# Patient Record
Sex: MALE | Race: BLACK OR AFRICAN AMERICAN
[De-identification: names, ages, dates, MRNs, and addresses within clinical notes are randomized per-mention and may not be internally consistent; named-entity substitution may affect disease eponyms.]

---

## 2017-03-06 ENCOUNTER — HOSPITAL ENCOUNTER (OUTPATIENT)
Dept: HOSPITAL 62 - ER | Age: 57
Setting detail: OBSERVATION
LOS: 2 days | Discharge: HOME | End: 2017-03-08
Attending: FAMILY MEDICINE | Admitting: FAMILY MEDICINE
Payer: MEDICAID

## 2017-03-06 DIAGNOSIS — Z23: ICD-10-CM

## 2017-03-06 DIAGNOSIS — K40.30: Primary | ICD-10-CM

## 2017-03-06 DIAGNOSIS — Z79.4: ICD-10-CM

## 2017-03-06 DIAGNOSIS — Z95.1: ICD-10-CM

## 2017-03-06 DIAGNOSIS — E11.9: ICD-10-CM

## 2017-03-06 DIAGNOSIS — I13.0: ICD-10-CM

## 2017-03-06 DIAGNOSIS — Z79.02: ICD-10-CM

## 2017-03-06 DIAGNOSIS — I25.2: ICD-10-CM

## 2017-03-06 DIAGNOSIS — I50.9: ICD-10-CM

## 2017-03-06 DIAGNOSIS — N18.9: ICD-10-CM

## 2017-03-06 DIAGNOSIS — E66.01: ICD-10-CM

## 2017-03-06 DIAGNOSIS — J44.9: ICD-10-CM

## 2017-03-06 DIAGNOSIS — I25.10: ICD-10-CM

## 2017-03-06 LAB
ALBUMIN SERPL-MCNC: 4.6 G/DL (ref 3.5–5)
ALP SERPL-CCNC: 71 U/L (ref 38–126)
ALT SERPL-CCNC: 30 U/L (ref 21–72)
ANION GAP SERPL CALC-SCNC: 13 MMOL/L (ref 5–19)
APPEARANCE UR: CLEAR
AST SERPL-CCNC: 19 U/L (ref 17–59)
BASOPHILS # BLD AUTO: 0.1 10^3/UL (ref 0–0.2)
BASOPHILS NFR BLD AUTO: 1.4 % (ref 0–2)
BILIRUB DIRECT SERPL-MCNC: 0 MG/DL (ref 0–0.3)
BILIRUB SERPL-MCNC: 0.8 MG/DL (ref 0.2–1.3)
BILIRUB UR QL STRIP: NEGATIVE
BUN SERPL-MCNC: 24 MG/DL (ref 7–20)
CALCIUM: 10.1 MG/DL (ref 8.4–10.2)
CHLORIDE SERPL-SCNC: 104 MMOL/L (ref 98–107)
CO2 SERPL-SCNC: 27 MMOL/L (ref 22–30)
CREAT SERPL-MCNC: 1.43 MG/DL (ref 0.52–1.25)
EOSINOPHIL # BLD AUTO: 0.3 10^3/UL (ref 0–0.6)
EOSINOPHIL NFR BLD AUTO: 3.5 % (ref 0–6)
ERYTHROCYTE [DISTWIDTH] IN BLOOD BY AUTOMATED COUNT: 14 % (ref 11.5–14)
GLUCOSE SERPL-MCNC: 255 MG/DL (ref 75–110)
GLUCOSE UR STRIP-MCNC: NEGATIVE MG/DL
HCT VFR BLD CALC: 39.6 % (ref 37.9–51)
HGB BLD-MCNC: 13.1 G/DL (ref 13.5–17)
HGB HCT DIFFERENCE: -0.3
KETONES UR STRIP-MCNC: NEGATIVE MG/DL
LYMPHOCYTES # BLD AUTO: 2.3 10^3/UL (ref 0.5–4.7)
LYMPHOCYTES NFR BLD AUTO: 28.6 % (ref 13–45)
MCH RBC QN AUTO: 26.7 PG (ref 27–33.4)
MCHC RBC AUTO-ENTMCNC: 33 G/DL (ref 32–36)
MCV RBC AUTO: 81 FL (ref 80–97)
MONOCYTES # BLD AUTO: 0.7 10^3/UL (ref 0.1–1.4)
MONOCYTES NFR BLD AUTO: 9.1 % (ref 3–13)
NEUTROPHILS # BLD AUTO: 4.6 10^3/UL (ref 1.7–8.2)
NEUTS SEG NFR BLD AUTO: 57.4 % (ref 42–78)
NITRITE UR QL STRIP: NEGATIVE
PH UR STRIP: 5 [PH] (ref 5–9)
POTASSIUM SERPL-SCNC: 4.9 MMOL/L (ref 3.6–5)
PROT SERPL-MCNC: 8 G/DL (ref 6.3–8.2)
PROT UR STRIP-MCNC: NEGATIVE MG/DL
RBC # BLD AUTO: 4.89 10^6/UL (ref 4.35–5.55)
SODIUM SERPL-SCNC: 144.2 MMOL/L (ref 137–145)
SP GR UR STRIP: 1.02
UROBILINOGEN UR-MCNC: NEGATIVE MG/DL (ref ?–2)
WBC # BLD AUTO: 7.9 10^3/UL (ref 4–10.5)

## 2017-03-06 PROCEDURE — 93010 ELECTROCARDIOGRAM REPORT: CPT

## 2017-03-06 PROCEDURE — 74176 CT ABD & PELVIS W/O CONTRAST: CPT

## 2017-03-06 PROCEDURE — 82962 GLUCOSE BLOOD TEST: CPT

## 2017-03-06 PROCEDURE — 90471 IMMUNIZATION ADMIN: CPT

## 2017-03-06 PROCEDURE — 36415 COLL VENOUS BLD VENIPUNCTURE: CPT

## 2017-03-06 PROCEDURE — 99285 EMERGENCY DEPT VISIT HI MDM: CPT

## 2017-03-06 PROCEDURE — 93005 ELECTROCARDIOGRAM TRACING: CPT

## 2017-03-06 PROCEDURE — 80053 COMPREHEN METABOLIC PANEL: CPT

## 2017-03-06 PROCEDURE — 76870 US EXAM SCROTUM: CPT

## 2017-03-06 PROCEDURE — 90686 IIV4 VACC NO PRSV 0.5 ML IM: CPT

## 2017-03-06 PROCEDURE — 85025 COMPLETE CBC W/AUTO DIFF WBC: CPT

## 2017-03-06 PROCEDURE — 3E0234Z INTRODUCTION OF SERUM, TOXOID AND VACCINE INTO MUSCLE, PERCUTANEOUS APPROACH: ICD-10-PCS | Performed by: EMERGENCY MEDICINE

## 2017-03-06 PROCEDURE — 93976 VASCULAR STUDY: CPT

## 2017-03-06 PROCEDURE — 81001 URINALYSIS AUTO W/SCOPE: CPT

## 2017-03-06 PROCEDURE — G0378 HOSPITAL OBSERVATION PER HR: HCPCS

## 2017-03-06 PROCEDURE — S0028 INJECTION, FAMOTIDINE, 20 MG: HCPCS

## 2017-03-06 RX ADMIN — SODIUM CHLORIDE PRN ML: 0.45 INJECTION, SOLUTION INTRAVENOUS at 23:40

## 2017-03-06 RX ADMIN — HYDROMORPHONE HYDROCHLORIDE PRN MG: 2 INJECTION INTRAMUSCULAR; INTRAVENOUS; SUBCUTANEOUS at 23:55

## 2017-03-06 RX ADMIN — FAMOTIDINE SCH MG: 10 INJECTION INTRAVENOUS at 23:55

## 2017-03-06 NOTE — ER DOCUMENT REPORT
ED GI/





- General


Chief Complaint: Groin Pain


Stated Complaint: RIGHT SIDE GROIN PAIN


Mode of Arrival: Ambulatory


Information source: Patient


Notes: 


56-year-old male who states around 2 days ago he developed some right groin 

pain and swelling to his right scrotum.  He denies any dysuria, nausea, vomiting

, fevers, history of abdominal surgeries,.  Patient went to see his primary 

care physician who sent the patient here for evaluation of "possible hernia".





Patient states the pain is "painful", constant, without radiation.  No real 

aggravating relieving factors.


TRAVEL OUTSIDE OF THE [place] IN LAST 30 DAYS: No





- HPI


Patient complains to provider of: Groin pain


Onset: Other - See above


Timing/Duration: Gradual


Quality of pain: Other - See above


Severity at maximum: Moderate


Severity in ED: Moderate


Pain Level: 2


Location: Other - See above


Sexual history: Active


Associated symptoms: Other - See above


Exacerbated by: Denies


Relieved by: Denies


Similar symptoms previously: No


Recently seen / treated by doctor: Yes





- Related Data


Allergies/Adverse Reactions: 


 





No Known Allergies Allergy (Verified 05/27/14 08:19)


 











Past Medical History





- General


Information source: Patient





- Social History


Smoking Status: Never Smoker


Chew tobacco use (# tins/day): No


Frequency of alcohol use: Occasional


Drug Abuse: None


Family History: CAD, CVA, Hypertension


Patient has suicidal ideation: No


Patient has homicidal ideation: No





- Past Medical History


Cardiac Medical History: Reports: Hx Congestive Heart Failure, Hx Coronary 

Artery Disease, Hx Heart Attack, Hx Hypercholesterolemia, Hx Hypertension


Pulmonary Medical History: Reports: Hx COPD, Hx Sleep Apnea


Endocrine Medical History: Reports: Hx Diabetes Mellitus Type 2


Renal/ Medical History: Reports: Hx Benign Prostatic Hyperplasia.  Denies: Hx 

Peritoneal Dialysis


Musculoskeltal Medical History: Reports Hx Arthritis


Traumatic Medical History: Reports: Hx Fractures - L ankle


Past Surgical History: Reports: Hx Cardiac Surgery - CABGx3 10/2014, Hx 

Coronary Artery Bypass Graft





- Immunizations


Hx Diphtheria, Pertussis, Tetanus Vaccination: Yes





Review of Systems





- Review of Systems


Constitutional: denies: Fever


Cardiovascular: denies: Chest pain


Respiratory: denies: Short of breath


Gastrointestinal: denies: Vomiting


Genitourinary: denies: Dysuria, Frequency, Flank pain, Hematuria, Urgency, 

Retention


Musculoskeletal: denies: Leg swelling


Skin: Other - no hives.  denies: Rash


Neurological/Psychological: Other - no slurred speech


-: Yes All other systems reviewed and negative





Physical Exam





- Vital signs


Vitals: 


 











Temp Pulse Resp BP Pulse Ox


 


 98.1 F   89   20   138/88 H  96 


 


 03/06/17 10:57  03/06/17 10:57  03/06/17 10:57  03/06/17 10:57  03/06/17 10:57











Notes: 


Reviewed vital signs and nursing note as charted by RN.





CONSTITUTIONAL: Alert and oriented and responds appropriately to questions. Well

-appearing; well-nourished





HEAD: Normocephalic; atraumatic





CARD: Regular rate and rhythm; no murmurs, no clicks, no rubs, no gallops; 

symmetric distal pulses





RESP: Normal chest excursion without splinting or tachypnea; breath sounds 

clear and equal bilaterally; no wheezes, no rhonchi, no rales





ABD/GI: Normal bowel sounds; elevated BMI; no lower abdominal swelling or 

induration.





GI/: Patient has some swelling and fullness to the right testicular/scrotal 

region.  No erythema noted.  No perineal lesions, erythema, or induration.  No 

penile lesions present.





BACK: The back appears normal and is non-tender to palpation, there is no CVA 

tenderness





EXT: Normal ROM in all joints; non-tender to palpation; no cyanosis, no 

effusions, no edema





SKIN: Normal color for age and race; warm; dry; good turgor; capillary refill < 

2 seconds; no acute lesions noted





NEURO: Moves all extremities equally; Motor and sensory function intact





PSYCH: The patient's mood and manner are appropriate. Grooming and personal 

hygiene are appropriate.





Course





- Re-evaluation


Re-evalutation: 


03/06/17 15:32


Given the history and physical examination we will order an ultrasound of the 

scrotum.  Basic labs have been ordered for possible preop.





03/06/17 17:01


I have called ultrasound as the patient has yet to go to ultrasound after over 

2 hours of the order being placed.  She states that she has called for the 

patient but has yet to be transported down to the ultrasound suite.





03/06/17 19:00


Surgery has seen and evaluated the patient regarding the ultrasound result.  No 

change in exam.  White count is recorded.  The surgeon would like to hold the 

patient overnight with nothing by mouth at midnight, holding the Plavix, admit 

the patient to the primary care physician for possible surgery tomorrow.





- Vital Signs


Vital signs: 


 











Temp Pulse Resp BP Pulse Ox


 


 98.1 F   89   20   138/88 H  96 


 


 03/06/17 10:57  03/06/17 10:57  03/06/17 10:57  03/06/17 10:57  03/06/17 10:57














- Laboratory


Result Diagrams: 


 03/06/17 11:20





 03/06/17 11:20


Laboratory results interpreted by me: 


 











  03/06/17 03/06/17





  11:20 11:20


 


Hgb  13.1 L 


 


MCH  26.7 L 


 


BUN   24 H


 


Creatinine   1.43 H


 


Est GFR (Non-Af Amer)   51 L


 


Glucose   255 H














Discharge





- Discharge


Clinical Impression: 


 Inguinal hernia of right side with obstruction and without gangrene





Condition: Fair


Disposition: ADMITTED AS INPATIENT


Admitting Provider: Jimenez


Unit Admitted: Medical Floor

## 2017-03-06 NOTE — PDOC CONSULTATION
History of Present Illness


Admission Date/PCP: 


  03/06/17 19:18





  ANGLE RHODES MD





History of Present Illness: 


ILANA ARMSTRONG JR is a 56 year old male is referred to the emergency department by 

Dr. Hargrove this afternoon after being seen in his office for 2-1/2 day history 

of right testicular swelling.  Recent symptoms started insidiously to about 

days ago.  He denies history of trauma, no hernia, constipation or any other 

precipitating factors.  Pain was worse last night.  He denies nausea or 

vomiting or any other constitutional symptoms.  In the emergency department he 

had a ultrasound of his scrotum which showed incarcerated omentum on the right 

side.  Testicles were deemed viable.  The patient is being admitted the 

hospital for observation, with holding of Plavix, and interval surgery.








Past Medical History


Cardiac Medical History: Reports: Congestive Heart Failure, Coronary Artery 

Disease, Myocardial Infarction, Hyperlipidema, Hypertension


Pulmonary Medical History: Reports: Chronic Obstructive Pulmonary Disease (COPD)

, Sleep Apnea


Endocrine Medical History: Reports: Diabetes Mellitus Type 2


Musculoskeltal Medical History: Reports: Arthritis





Past Surgical History


Past Surgical History: Reports: Coronary Artery Bypass Graft, Other - Removal 

of blister left foot, likely diabetic in nature





Social History


Smoking Status: Never Smoker


Frequency of Alcohol Use: None


Hx Recreational Drug Use: No


Hx Prescription Drug Abuse: No





Family History


Family History: CAD, CVA, Hypertension


Parental Family History Reviewed: Yes


Children Family History Reviewed: Yes


Sibling(s) Family History Reviewed.: Yes





Medication/Allergy


Home Medications: 








Simvastatin [Zocor 80 mg Tablet] 40 mg PO QHS 05/27/14 


Aspirin [Ecotrin 325 mg EC Tablet] 325 mg PO DAILY #0 tabec 05/29/14 


Nitroglycerin [Nitrostat 0.4 mg (1/150 Gr) Tabs 25/Bottle] 1 tab SL Q5MP PRN #1 

bottle 05/29/14 


Clopidogrel Bisulfate [Plavix 75 mg Tablet] 75 mg PO DAILY 04/29/15 


Docusate Sodium [Colace 100 mg Capsule] 100 mg PO BID 04/29/15 


Famotidine [Pepcid 20 mg Tablet] 20 mg PO BID 04/29/15 


Furosemide [Lasix 20 mg Tablet] 20 mg PO QAM 04/29/15 


Insulin Glargine,Hum.rec.anlog [Lantus Insulin 100 Unit/1 ml 10 ml] 70 unit 

SUBCUT QHS 04/29/15 


Lisinopril [Prinivil 10 mg Tablet] 10 mg PO DAILY 04/29/15 


Tamsulosin HCl [Flomax] 0.4 mg PO DAILY 04/29/15 








Allergies/Adverse Reactions: 


 





No Known Allergies Allergy (Verified 05/27/14 08:19)


 











Review of Systems


Constitutional: PRESENT: as per HPI


Eyes: PRESENT: as per HPI


Ears: PRESENT: as per HPI


Nose, Mouth, and Throat: PRESENT: as per HPI


Breasts: PRESENT: as per HPI


Cardiovascular: PRESENT: as per HPI


Respiratory: PRESENT: as per HPI


Gastrointestinal: PRESENT: as per HPI


Genitourinary: PRESENT: as per HPI


Musculoskeletal: PRESENT: back pain





Physical Exam


Vital Signs: 


 











Temp Pulse Resp BP Pulse Ox


 


 98.1 F   89   20   138/88 H  96 


 


 03/06/17 10:57  03/06/17 10:57  03/06/17 10:57  03/06/17 10:57  03/06/17 10:57











General appearance: PRESENT: no acute distress


Head exam: PRESENT: normocephalic, other - Wears glasses


Eye exam: PRESENT: EOMI


Ear exam: PRESENT: normal external ear exam


Neck exam: PRESENT: full ROM


Respiratory exam: PRESENT: clear to auscultation tez


Cardiovascular exam: PRESENT: RRR


Pulses: PRESENT: normal carotid pulses, normal radial pulses


GI/Abdominal exam: PRESENT: soft, other - Nontender.


Rectal exam: PRESENT: deferred


Gentrourinary exam: PRESENT: other - The scrotum is boggy.  Tight.  The right 

hemiscrotum is more tender and full on the right than on the left.  Reduce the 

fullness on the right side.  Testicle was readily palpated, the right more 

obscure.  There are no masses at the level of the inguinal canals.  The cords  

are not tender.


Extremities exam: PRESENT: other - Cervical sandal left foot





Results


Impressions: 


 





Scrotum Ultrasound  03/06/17 15:26


IMPRESSION:  5 cm diameter fat containing right inguinal hernia, appears fixed 

with little movement on Valsalva maneuver.  No bowel involvement. NO EVIDENCE 

OF TESTICULAR MASS OR TORSION.  Microlithiasis.


 














Assessment & Plan





- Diagnosis


(1) Incarcerated right inguinal hernia


Is this a current diagnosis for this admission?: YesPlan: 


1.  The patient appears to have subacute incarceration of omentum into the 

right hemiscrotum.  He does not have an acutely threatened, and the risk of 

ischemia is low.  My concern is the patient's pain out of proportion to 

physical findings.





2.  Given his history of multiple comorbidities including diabetes mellitus, 

coronary artery disease, chronic renal insufficiency I believe the patient 

should be admitted, have his Plavix held, and set up for interval right 

inguinal herniorrhaphy this hospitalization, possibly within the next 24 hours..





3.  I've discussed the above with Dr. Hargrove, internist, who will admit the 

patient.  We will follow the patient on a consultant basis.











- Time


Time Spent: 30 to 50 Minutes


Critical Time spent with patient: 15-24 minutes

## 2017-03-06 NOTE — ER DOCUMENT REPORT
ED Medical Screen (RME)





- General


Stated Complaint: RIGHT SIDE GROIN PAIN


Time seen by provider: 11:14


Mode of Arrival: Ambulatory


Information source: Patient


Notes: 


56-year-old male sent by Dr. Loera for possible incarcerated right inguinal 

hernia.  Started having burning right groin pain on Saturday with small bowel 

movements.  No history of hernia repair





I have greeted and performed a rapid initial assessment of this patient.  A 

comprehensive ED assessment, evaluation of the patient, analysis of test results

, and completion of the medical decision making process will be conducted by 

additional ED providers.


TRAVEL OUTSIDE OF THE [place] IN LAST 30 DAYS: No





- Related Data


Allergies/Adverse Reactions: 


 





No Known Allergies Allergy (Verified 05/27/14 08:19)


 











Past Medical History





- Past Medical History


Cardiac Medical History: Reports: Hx Congestive Heart Failure, Hx Coronary 

Artery Disease, Hx Heart Attack, Hx Hypercholesterolemia, Hx Hypertension


Pulmonary Medical History: Reports: Hx COPD, Hx Sleep Apnea


Endocrine Medical History: Reports: Hx Diabetes Mellitus Type 2


Renal/ Medical History: Reports: Hx Benign Prostatic Hyperplasia


Musculoskeltal Medical History: Reports Hx Arthritis


Traumatic Medical History: Reports: Hx Fractures - L ankle


Past Surgical History: Reports: Hx Cardiac Surgery - CABGx3 10/2014, Hx 

Coronary Artery Bypass Graft





- Immunizations


Hx Diphtheria, Pertussis, Tetanus Vaccination: Yes





Physical Exam





- Vital signs


Vitals: 





 











Temp Pulse Resp BP Pulse Ox


 


 98.1 F   89   20   138/88 H  96 


 


 03/06/17 10:57  03/06/17 10:57  03/06/17 10:57  03/06/17 10:57  03/06/17 10:57














Course





- Vital Signs


Vital signs: 





 











Temp Pulse Resp BP Pulse Ox


 


 98.1 F   89   20   138/88 H  96 


 


 03/06/17 10:57  03/06/17 10:57  03/06/17 10:57  03/06/17 10:57  03/06/17 10:57

## 2017-03-06 NOTE — PDOC H&P
History of Present Illness


Admission Date/PCP: 


  03/06/17 19:18





  ANGLE RHODES MD





Patient complains of: R groin pain


History of Present Illness: 


ILANA ARMSTRONG JR is a 56 year old male is referred to the emergency department by 

Dr. Hargrove this afternoon after being seen in his office for 2-1/2 day history 

of right testicular swelling.  Recent symptoms started insidiously to about 

days ago.  He denies history of trauma, no hernia, constipation or any other 

precipitating factors.  Pain was worse last night.  He denies nausea or 

vomiting or any other constitutional symptoms.  In the emergency department he 

had a ultrasound of his scrotum which showed incarcerated omentum on the right 

side.  Testicles were deemed viable.  The patient is being admitted the 

hospital for observation, with holding of Plavix, and interval surgery.








Past Medical History


Cardiac Medical History: Reports: Congestive Heart Failure, Coronary Artery 

Disease, Myocardial Infarction, Hyperlipidema, Hypertension


Pulmonary Medical History: Reports: Chronic Obstructive Pulmonary Disease (COPD)

, Sleep Apnea


Endocrine Medical History: Reports: Diabetes Mellitus Type 2


Musculoskeltal Medical History: Reports: Arthritis





Past Surgical History


Past Surgical History: Reports: Coronary Artery Bypass Graft, Other - Removal 

of blister left foot, likely diabetic in nature





Social History


Smoking Status: Never Smoker


Frequency of Alcohol Use: None


Hx Recreational Drug Use: No


Hx Prescription Drug Abuse: No





- Advance Directive


Resuscitation Status: Full Code





Family History


Family History: CAD, CVA, Hypertension


Parental Family History Reviewed: Yes


Children Family History Reviewed: Yes


Sibling(s) Family History Reviewed.: Yes





Medication/Allergy


Home Medications: 








Simvastatin [Zocor 80 mg Tablet] 40 mg PO QHS 05/27/14 


Aspirin [Ecotrin 325 mg EC Tablet] 325 mg PO DAILY #0 tabec 05/29/14 


Nitroglycerin [Nitrostat 0.4 mg (1/150 Gr) Tabs 25/Bottle] 1 tab SL Q5MP PRN #1 

bottle 05/29/14 


Clopidogrel Bisulfate [Plavix 75 mg Tablet] 75 mg PO DAILY 04/29/15 


Docusate Sodium [Colace 100 mg Capsule] 100 mg PO BID 04/29/15 


Famotidine [Pepcid 20 mg Tablet] 20 mg PO BID 04/29/15 


Furosemide [Lasix 20 mg Tablet] 20 mg PO QAM 04/29/15 


Insulin Glargine,Hum.rec.anlog [Lantus Insulin 100 Unit/1 ml 10 ml] 70 unit 

SUBCUT QHS 04/29/15 


Lisinopril [Prinivil 10 mg Tablet] 10 mg PO DAILY 04/29/15 


Tamsulosin HCl [Flomax] 0.4 mg PO DAILY 04/29/15 








Allergies/Adverse Reactions: 


 





No Known Allergies Allergy (Verified 05/27/14 08:19)


 











Review of Systems


Constitutional: ABSENT: fever(s), headache(s), weight loss


Nose, Mouth, and Throat: ABSENT: sore throat


Cardiovascular: ABSENT: chest pain, orthropnea


Respiratory: ABSENT: cough, dyspnea


Gastrointestinal: ABSENT: abdominal pain, constipation, hematochezia, melena, 

vomiting


Genitourinary: ABSENT: difficulty urinating, dysuria, hematuria





Physical Exam


Vital Signs: 


 











Temp Pulse Resp BP Pulse Ox


 


 98.1 F   89   20   138/88 H  96 


 


 03/06/17 10:57  03/06/17 10:57  03/06/17 10:57  03/06/17 10:57  03/06/17 10:57











General appearance: PRESENT: mild distress


Mouth exam: PRESENT: moist


Neck exam: ABSENT: lymphadenopathy, tenderness, thyromegaly, tracheal deviation


Respiratory exam: PRESENT: clear to auscultation tez


Cardiovascular exam: ABSENT: diastolic murmur, irregular rhythm, systolic murmur


GI/Abdominal exam: PRESENT: distended, hernia - R indirect incarcerated 

moderately tender, soft, tenderness - mild RLQ.  ABSENT: guarding, mass, 

organolmegaly, rebound, rigid


Gentrourinary exam: PRESENT: scrotal swelling.  ABSENT: testicular tenderness


Extremities exam: ABSENT: pedal edema


Neurological exam: PRESENT: oriented to situation


Psychiatric exam: PRESENT: appropriate affect





Results


Laboratory Results: 


 Abnormal - 24 hr











  03/06/17 03/06/17





  11:20 11:20


 


Hgb  13.1 L 


 


MCH  26.7 L 


 


BUN   24 H


 


Creatinine   1.43 H


 


Est GFR (Non-Af Amer)   51 L


 


Glucose   255 H











Impressions: 


 





Scrotum Ultrasound  03/06/17 15:26


IMPRESSION:  5 cm diameter fat containing right inguinal hernia, appears fixed 

with little movement on Valsalva maneuver.  No bowel involvement. NO EVIDENCE 

OF TESTICULAR MASS OR TORSION.  Microlithiasis.


 














Assessment & Plan





- Diagnosis


(1) Inguinal hernia of right side with obstruction and without gangrene


Is this a current diagnosis for this admission?: YesPlan: 


hold plavix then surgery











- Time


Time Spent: 30 to 50 Minutes


Medications reviewed and adjusted accordingly: Yes


Anticipated discharge: Home


Within: within 72 hours

## 2017-03-07 LAB
BASOPHILS # BLD AUTO: 0 10^3/UL (ref 0–0.2)
BASOPHILS NFR BLD AUTO: 0.5 % (ref 0–2)
EOSINOPHIL # BLD AUTO: 0.3 10^3/UL (ref 0–0.6)
EOSINOPHIL NFR BLD AUTO: 4.2 % (ref 0–6)
ERYTHROCYTE [DISTWIDTH] IN BLOOD BY AUTOMATED COUNT: 14.1 % (ref 11.5–14)
HCT VFR BLD CALC: 35.8 % (ref 37.9–51)
HGB BLD-MCNC: 11.8 G/DL (ref 13.5–17)
HGB HCT DIFFERENCE: -0.4
LYMPHOCYTES # BLD AUTO: 2.2 10^3/UL (ref 0.5–4.7)
LYMPHOCYTES NFR BLD AUTO: 31.8 % (ref 13–45)
MCH RBC QN AUTO: 26.7 PG (ref 27–33.4)
MCHC RBC AUTO-ENTMCNC: 32.8 G/DL (ref 32–36)
MCV RBC AUTO: 81 FL (ref 80–97)
MONOCYTES # BLD AUTO: 0.7 10^3/UL (ref 0.1–1.4)
MONOCYTES NFR BLD AUTO: 10.3 % (ref 3–13)
NEUTROPHILS # BLD AUTO: 3.7 10^3/UL (ref 1.7–8.2)
NEUTS SEG NFR BLD AUTO: 53.2 % (ref 42–78)
RBC # BLD AUTO: 4.41 10^6/UL (ref 4.35–5.55)
WBC # BLD AUTO: 7 10^3/UL (ref 4–10.5)

## 2017-03-07 RX ADMIN — SODIUM CHLORIDE PRN ML: 0.45 INJECTION, SOLUTION INTRAVENOUS at 22:49

## 2017-03-07 RX ADMIN — HYDROMORPHONE HYDROCHLORIDE PRN MG: 2 INJECTION INTRAMUSCULAR; INTRAVENOUS; SUBCUTANEOUS at 06:05

## 2017-03-07 RX ADMIN — SODIUM CHLORIDE PRN ML: 0.45 INJECTION, SOLUTION INTRAVENOUS at 13:07

## 2017-03-07 RX ADMIN — INSULIN HUMAN PRN UNIT: 100 INJECTION, SOLUTION PARENTERAL at 13:26

## 2017-03-07 RX ADMIN — SODIUM CHLORIDE PRN ML: 0.45 INJECTION, SOLUTION INTRAVENOUS at 06:55

## 2017-03-07 RX ADMIN — FAMOTIDINE SCH MG: 10 INJECTION INTRAVENOUS at 09:37

## 2017-03-07 RX ADMIN — FAMOTIDINE SCH MG: 10 INJECTION INTRAVENOUS at 22:48

## 2017-03-07 RX ADMIN — INSULIN HUMAN PRN UNIT: 100 INJECTION, SOLUTION PARENTERAL at 01:04

## 2017-03-07 RX ADMIN — INSULIN HUMAN PRN UNIT: 100 INJECTION, SOLUTION PARENTERAL at 18:22

## 2017-03-07 NOTE — PROGRESS NOTE E
Progress Note



NAME: ILANA ARMSTRONG

MRN: [id number]

: 1960             AGE: 56Y

DATE:  2017           ROOM: 313



SUBJECTIVE:

Patient did have some pain in the right groin region overnight but was

given IV Dilaudid 1 mg, and this relieved his pain.  He currently is not

having any discomfort.  He denies any nausea and vomiting.  He has not had

a bowel movement.



OBJECTIVE:

The right groin was examined.  Did not feel any obvious incarcerated

hernia at this time.  In the right side of the scrotum, there is some

swelling present with possible fluid.  However, I do not feel any obvious

omentum or bowel present in the hernia.  Normal testicular size.  Abdomen

is soft, nontender, obese.



DIAGNOSTIC DATA:

White blood cell count 7, hemoglobin 11.8, platelets of 299.  Glucose of

255.  Creatinine 1.43.



ASSESSMENT:

1.  RIGHT GROIN PAIN WITH INCARCERATED RIGHT INGUINAL HERNIA WITH OMENTUM

BEING SEEN ON ULTRASOUND.  THERE DOES NOT APPEAR TO BE ANY SMALL BOWEL

PRESENT IN THE HERNIA.  HIS PAIN IS CONTROLLED WITH MINIMAL PAIN

MEDICATION AT THIS TIME.  HE DOES NOT HAVE ANY OBVIOUS SIGNS OF

OBSTRUCTION SUCH AS INCREASE IN ABDOMINAL DISTENTION, NAUSEA AND VOMITING.

Complication for repairing the hernia is that he is on Plavix with his

last dose yesterday.  I would recommend being off Plavix for at least 5

days for semi-urgent or elective surgery.  I would recommend CT scan of

the abdomen and pelvis to rule out any incarceration with small intestine.

If none is seen, then I would recommend pain control and doing his surgery

electively or semi-electively, being off Plavix for at least 5 days before

surgery.  I would recommend that he continue to be n.p.o. until he has had

the CT scan.

2.  CORONARY ARTERY DISEASE, ON PLAVIX.  I would recommend if possible

being off Plavix at least 5 days before surgery.  Possibly needs to be

placed on aspirin during this time.  Will need to obtain his cardiology

records and possible cardiac evaluation prior to surgery.

3.  DIABETES WITH GLUCOSE .  Recommend tighter glucose control prior

to surgery.

4.  HYPERTENSION, ON MEDICATIONS.  Blood pressure 132/74 and 130/87 at

current time.  Will allow Medicine to continue to monitor this.

5.  MORBID OBESITY.

6.  CHRONIC RENAL INSUFFICIENCY WITH MILDLY ELEVATED CREATININE OF 1.43.



PLAN:

1.  CT scan of the abdomen and pelvis to rule out any bowel component in

the hernia.

2.  If no bowel is present, then recommend placing on oral pain medication

and allowing patient to eat.  If he is doing well then, he may be

discharged and his repair of right incarcerated inguinal hernia performed

in a semi-elective time, being off Plavix for at least 5 days.

3.  Blood sugar control.





DICTATING PHYSICIAN:  ANDRESSA MARI M.D.





[id number]                  DT: [id number]

PHY#: [id number]            DD: [id number]

ID:   [id number]           JOB#: [id number]       ACCT: [id number]



cc:

>

## 2017-03-07 NOTE — EKG REPORT
SEVERITY:- OTHERWISE NORMAL ECG -

SINUS RHYTHM

BORDERLINE LEFT AXIS DEVIATION

:

Confirmed by: Reanna Fletcher 07-Mar-2017 12:55:27

## 2017-03-07 NOTE — PDOC PROGRESS REPORT
Subjective


Progress Note for:: 03/07/17


Subjective:: 


pain less persistant





Physical Exam


Vital Signs: 


 











Temp Pulse Resp BP Pulse Ox


 


 97.8 F   70   18   132/74 H  98 


 


 03/07/17 04:23  03/07/17 04:23  03/07/17 04:23  03/07/17 04:23  03/07/17 04:23








 Intake & Output











 03/06/17 03/07/17 03/08/17





 07:59 07:59 07:59


 


Intake Total  875 


 


Balance  875 


 


Weight  299 lb 13.259 oz 











General appearance: PRESENT: no acute distress


Respiratory exam: PRESENT: clear to auscultation tez


Cardiovascular exam: ABSENT: diastolic murmur, irregular rhythm, systolic murmur


GI/Abdominal exam: ABSENT: mass, organolmegaly, tenderness


Extremities exam: ABSENT: pedal edema





Results


Laboratory Results: 


 





 03/07/17 04:51 





 











  03/07/17





  04:51


 


WBC  7.0


 


RBC  4.41


 


Hgb  11.8 L


 


Hct  35.8 L


 


MCV  81


 


MCH  26.7 L


 


MCHC  32.8


 


RDW  14.1 H


 


Plt Count  299


 


Seg Neutrophils %  53.2


 


Lymphocytes %  31.8


 


Monocytes %  10.3


 


Eosinophils %  4.2


 


Basophils %  0.5


 


Absolute Neutrophils  3.7


 


Absolute Lymphocytes  2.2


 


Absolute Monocytes  0.7


 


Absolute Eosinophils  0.3


 


Absolute Basophils  0.0











Impressions: 


 





Scrotum Ultrasound  03/06/17 15:26


IMPRESSION:  5 cm diameter fat containing right inguinal hernia, appears fixed 

with little movement on Valsalva maneuver.  No bowel involvement. NO EVIDENCE 

OF TESTICULAR MASS OR TORSION.  Microlithiasis.


 














Assessment & Plan





- Diagnosis


(1) Inguinal hernia of right side with obstruction and without gangrene


Is this a current diagnosis for this admission?: YesPlan: 


Dr MCNALLY plans surgery as soon as plavix level lower

## 2017-03-08 VITALS — DIASTOLIC BLOOD PRESSURE: 89 MMHG | SYSTOLIC BLOOD PRESSURE: 141 MMHG

## 2017-03-08 LAB
BASOPHILS # BLD AUTO: 0 10^3/UL (ref 0–0.2)
BASOPHILS NFR BLD AUTO: 0.6 % (ref 0–2)
EOSINOPHIL # BLD AUTO: 0.3 10^3/UL (ref 0–0.6)
EOSINOPHIL NFR BLD AUTO: 4.8 % (ref 0–6)
ERYTHROCYTE [DISTWIDTH] IN BLOOD BY AUTOMATED COUNT: 13.9 % (ref 11.5–14)
HCT VFR BLD CALC: 36.3 % (ref 37.9–51)
HGB BLD-MCNC: 12 G/DL (ref 13.5–17)
HGB HCT DIFFERENCE: -0.3
LYMPHOCYTES # BLD AUTO: 2.2 10^3/UL (ref 0.5–4.7)
LYMPHOCYTES NFR BLD AUTO: 34.8 % (ref 13–45)
MCH RBC QN AUTO: 26.6 PG (ref 27–33.4)
MCHC RBC AUTO-ENTMCNC: 32.9 G/DL (ref 32–36)
MCV RBC AUTO: 81 FL (ref 80–97)
MONOCYTES # BLD AUTO: 0.6 10^3/UL (ref 0.1–1.4)
MONOCYTES NFR BLD AUTO: 9.9 % (ref 3–13)
NEUTROPHILS # BLD AUTO: 3.2 10^3/UL (ref 1.7–8.2)
NEUTS SEG NFR BLD AUTO: 49.9 % (ref 42–78)
RBC # BLD AUTO: 4.49 10^6/UL (ref 4.35–5.55)
WBC # BLD AUTO: 6.5 10^3/UL (ref 4–10.5)

## 2017-03-08 RX ADMIN — INSULIN HUMAN PRN UNIT: 100 INJECTION, SOLUTION PARENTERAL at 00:33

## 2017-03-08 NOTE — PDOC DISCHARGE SUMMARY
General





- Admit/Disc Date/PCP


Admission Date/Primary Care Provider: 


  03/06/17 20:24





  ANGLE RHODES MD





Discharge Date: 03/08/17





- Discharge Diagnosis


(1) Inguinal hernia of right side with obstruction and without gangrene


Is this a current diagnosis for this admission?: YesSummary: 


on ct no bowel in sac. Surgicalists suggested elective repair next week when 

plavix is gone. Pain minimal last 24h











- Additional Information


Resuscitation Status: Full Code


Discharge Diet: Cardiac, Diabetic


Discharge Activity: Activity As Tolerated


Home Medications: 








Simvastatin [Zocor 80 mg Tablet] 40 mg PO QHS 05/27/14 


Docusate Sodium [Colace 100 mg Capsule] 100 mg PO BID 04/29/15 


Famotidine [Pepcid 20 mg Tablet] 20 mg PO BID 04/29/15 


Furosemide [Lasix 20 mg Tablet] 20 mg PO QAM 04/29/15 


Insulin Glargine,Hum.rec.anlog [Lantus Insulin 100 Unit/1 ml 10 ml] 40 unit 

SUBCUT ACLUNCH 04/29/15 


Lisinopril [Prinivil 10 mg Tablet] 5 mg PO DAILY 04/29/15 


Tamsulosin HCl [Flomax] 0.4 mg PO DAILY 04/29/15 


Doxepin HCl 50 mg PO QHS 03/06/17 


Gabapentin [Neurontin 300 mg Capsule] 300 mg PO DAILY 03/06/17 


Insulin Aspart [Novolog Flexpen] 10 units SQ AC 03/06/17 


Metformin HCl [Metformin HCl ER] 750 mg PO BID 03/06/17 


Potassium Chloride [K-Tab ER] 10 meq PO DAILY 03/06/17 


Risperidone [Risperdal 1 mg Tablet] 1 mg PO QHS 03/06/17 


Hydrocodone/Acetaminophen [Norco 5-325 Tablet] 1 each PO BID PRN #10 tablet 03/ 08/17 











History of Present Illness


Patient complains of: R groin pain


History of Present Illness: 


ILANA ARMSTRONG JR is a 56 year old male is referred to the emergency department by 

Dr. Hargrove this afternoon after being seen in his office for 2-1/2 day history 

of right testicular swelling.  Recent symptoms started insidiously to about 

days ago.  He denies history of trauma, no hernia, constipation or any other 

precipitating factors.  Pain was worse last night.  He denies nausea or 

vomiting or any other constitutional symptoms.  In the emergency department he 

had a ultrasound of his scrotum which showed incarcerated omentum on the right 

side.  Testicles were deemed viable.  The patient is being admitted the 

hospital for observation, with holding of Plavix, and interval surgery.








Hospital Course


Hospital Course: 


see above





Physical Exam


Vital Signs: 


 











Temp Pulse Resp BP Pulse Ox


 


 98.0 F   74   18   141/89 H  99 


 


 03/08/17 05:51  03/08/17 05:51  03/08/17 05:51  03/08/17 05:51  03/08/17 05:51








 Intake & Output











 03/06/17 03/07/17 03/08/17





 07:59 07:59 07:59


 


Intake Total  875 2278


 


Balance  875 2278


 


Weight  299 lb 13.259 oz 











General appearance: PRESENT: no acute distress


Respiratory exam: PRESENT: clear to auscultation tez


Cardiovascular exam: ABSENT: diastolic murmur, irregular rhythm, systolic murmur


GI/Abdominal exam: ABSENT: tenderness


Extremities exam: ABSENT: pedal edema





Results


Laboratory Results: 


 





 03/08/17 05:03 





 











  03/08/17





  05:03


 


WBC  6.5


 


RBC  4.49


 


Hgb  12.0 L


 


Hct  36.3 L


 


MCV  81


 


MCH  26.6 L


 


MCHC  32.9


 


RDW  13.9


 


Plt Count  316


 


Seg Neutrophils %  49.9


 


Lymphocytes %  34.8


 


Monocytes %  9.9


 


Eosinophils %  4.8


 


Basophils %  0.6


 


Absolute Neutrophils  3.2


 


Absolute Lymphocytes  2.2


 


Absolute Monocytes  0.6


 


Absolute Eosinophils  0.3


 


Absolute Basophils  0.0








 Labs- Last Values











WBC  6.5 10^3/uL (4.0-10.5)   03/08/17  05:03    


 


RBC  4.49 10^6/uL (4.35-5.55)   03/08/17  05:03    


 


Hgb  12.0 g/dL (13.5-17.0)  L  03/08/17  05:03    


 


Hct  36.3 % (37.9-51.0)  L  03/08/17  05:03    


 


MCV  81 fl (80-97)   03/08/17  05:03    


 


MCH  26.6 pg (27.0-33.4)  L  03/08/17  05:03    


 


MCHC  32.9 g/dL (32.0-36.0)   03/08/17  05:03    


 


RDW  13.9 % (11.5-14.0)   03/08/17  05:03    


 


Plt Count  316 10^3/uL (150-450)   03/08/17  05:03    


 


Seg Neutrophils %  49.9 % (42-78)   03/08/17  05:03    


 


Lymphocytes %  34.8 % (13-45)   03/08/17  05:03    


 


Monocytes %  9.9 % (3-13)   03/08/17  05:03    


 


Eosinophils %  4.8 % (0-6)   03/08/17  05:03    


 


Basophils %  0.6 % (0-2)   03/08/17  05:03    


 


Absolute Neutrophils  3.2 10^3/uL (1.7-8.2)   03/08/17  05:03    


 


Absolute Lymphocytes  2.2 10^3/uL (0.5-4.7)   03/08/17  05:03    


 


Absolute Monocytes  0.6 10^3/uL (0.1-1.4)   03/08/17  05:03    


 


Absolute Eosinophils  0.3 10^3/uL (0.0-0.6)   03/08/17  05:03    


 


Absolute Basophils  0.0 10^3/uL (0.0-0.2)   03/08/17  05:03    


 


Sodium  144.2 mmol/L (137-145)   03/06/17  11:20    


 


Potassium  4.9 mmol/L (3.6-5.0)   03/06/17  11:20    


 


Chloride  104 mmol/L ()   03/06/17  11:20    


 


Carbon Dioxide  27 mmol/L (22-30)   03/06/17  11:20    


 


Anion Gap  13  (5-19)   03/06/17  11:20    


 


BUN  24 mg/dL (7-20)  H  03/06/17  11:20    


 


Creatinine  1.43 mg/dL (0.52-1.25)  H  03/06/17  11:20    


 


Est GFR ( Amer)  > 60  (>60)   03/06/17  11:20    


 


Est GFR (Non-Af Amer)  51  (>60)  L  03/06/17  11:20    


 


Glucose  255 mg/dL ()  H  03/06/17  11:20    


 


POC Glucose  176 mg/dL ()  H  03/07/17  22:53    


 


Calcium  10.1 mg/dL (8.4-10.2)   03/06/17  11:20    


 


Total Bilirubin  0.8 mg/dL (0.2-1.3)   03/06/17  11:20    


 


Direct Bilirubin  0.0 mg/dL (0.0-0.3)   03/06/17  11:20    


 


AST  19 U/L (17-59)   03/06/17  11:20    


 


ALT  30 U/L (21-72)   03/06/17  11:20    


 


Alkaline Phosphatase  71 U/L ()   03/06/17  11:20    


 


Total Protein  8.0 g/dL (6.3-8.2)   03/06/17  11:20    


 


Albumin  4.6 g/dL (3.5-5.0)   03/06/17  11:20    


 


Urine Color  YELLOW   03/06/17  11:20    


 


Urine Appearance  CLEAR   03/06/17  11:20    


 


Urine pH  5.0  (5.0-9.0)   03/06/17  11:20    


 


Ur Specific Gravity  1.016   03/06/17  11:20    


 


Urine Protein  NEGATIVE mg/dL (NEGATIVE)   03/06/17  11:20    


 


Urine Glucose (UA)  NEGATIVE mg/dL (NEGATIVE)   03/06/17  11:20    


 


Urine Ketones  NEGATIVE mg/dL (NEGATIVE)   03/06/17  11:20    


 


Urine Blood  NEGATIVE  (NEGATIVE)   03/06/17  11:20    


 


Urine Nitrite  NEGATIVE  (NEGATIVE)   03/06/17  11:20    


 


Urine Bilirubin  NEGATIVE  (NEGATIVE)   03/06/17  11:20    


 


Urine Urobilinogen  NEGATIVE mg/dL (<2.0)   03/06/17  11:20    


 


Ur Leukocyte Esterase  NEGATIVE  (NEGATIVE)   03/06/17  11:20    


 


Urine WBC (Auto)  1 /HPF  03/06/17  11:20    


 


Urine RBC (Auto)  0 /HPF  03/06/17  11:20    


 


Urine Mucus (Auto)  RARE /LPF  03/06/17  11:20    


 


Urine Ascorbic Acid  NEGATIVE  (NEGATIVE)   03/06/17  11:20    











Impressions: 


 





Scrotum Ultrasound  03/06/17 15:26


IMPRESSION:  5 cm diameter fat containing right inguinal hernia, appears fixed 

with little movement on Valsalva maneuver.  No bowel involvement. NO EVIDENCE 

OF TESTICULAR MASS OR TORSION.  Microlithiasis.


 








Abdomen/Pelvis CT  03/07/17 00:00


IMPRESSION:  Fat containing right inguinal hernia


 














Plan


Discharge Plan: 


see Dr Briceno tomorrow to arrange surgery.

## 2017-03-14 LAB
ANION GAP SERPL CALC-SCNC: 13 MMOL/L (ref 5–19)
BUN SERPL-MCNC: 17 MG/DL (ref 7–20)
CALCIUM: 10.1 MG/DL (ref 8.4–10.2)
CHLORIDE SERPL-SCNC: 105 MMOL/L (ref 98–107)
CO2 SERPL-SCNC: 27 MMOL/L (ref 22–30)
CREAT SERPL-MCNC: 1.31 MG/DL (ref 0.52–1.25)
ERYTHROCYTE [DISTWIDTH] IN BLOOD BY AUTOMATED COUNT: 13.9 % (ref 11.5–14)
GLUCOSE SERPL-MCNC: 169 MG/DL (ref 75–110)
HCT VFR BLD CALC: 39.6 % (ref 37.9–51)
HGB BLD-MCNC: 13 G/DL (ref 13.5–17)
HGB HCT DIFFERENCE: -0.6
MCH RBC QN AUTO: 26.7 PG (ref 27–33.4)
MCHC RBC AUTO-ENTMCNC: 32.9 G/DL (ref 32–36)
MCV RBC AUTO: 81 FL (ref 80–97)
POTASSIUM SERPL-SCNC: 4.5 MMOL/L (ref 3.6–5)
RBC # BLD AUTO: 4.87 10^6/UL (ref 4.35–5.55)
SODIUM SERPL-SCNC: 144.5 MMOL/L (ref 137–145)
WBC # BLD AUTO: 6.8 10^3/UL (ref 4–10.5)

## 2017-03-14 NOTE — EKG REPORT
SEVERITY:- OTHERWISE NORMAL ECG -

SINUS RHYTHM

BORDERLINE LEFT AXIS DEVIATION

:

Confirmed by: Jinny Simpson MD 14-Mar-2017 17:15:11

## 2017-03-15 ENCOUNTER — HOSPITAL ENCOUNTER (OUTPATIENT)
Dept: HOSPITAL 62 - OROUT | Age: 57
Setting detail: OBSERVATION
LOS: 1 days | Discharge: HOME | End: 2017-03-16
Attending: SURGERY | Admitting: SURGERY
Payer: MEDICAID

## 2017-03-15 DIAGNOSIS — Z95.1: ICD-10-CM

## 2017-03-15 DIAGNOSIS — E11.9: ICD-10-CM

## 2017-03-15 DIAGNOSIS — I10: ICD-10-CM

## 2017-03-15 DIAGNOSIS — Z79.02: ICD-10-CM

## 2017-03-15 DIAGNOSIS — K40.30: Primary | ICD-10-CM

## 2017-03-15 LAB
GLUCOSE SERPL-MCNC: 134 MG/DL (ref 75–110)
POTASSIUM SERPL-SCNC: 4.2 MMOL/L (ref 3.6–5)

## 2017-03-15 PROCEDURE — 49507 PRP I/HERN INIT BLOCK >5 YR: CPT

## 2017-03-15 PROCEDURE — 0YU50JZ SUPPLEMENT RIGHT INGUINAL REGION WITH SYNTHETIC SUBSTITUTE, OPEN APPROACH: ICD-10-PCS | Performed by: SURGERY

## 2017-03-15 PROCEDURE — 93005 ELECTROCARDIOGRAM TRACING: CPT

## 2017-03-15 PROCEDURE — 85027 COMPLETE CBC AUTOMATED: CPT

## 2017-03-15 PROCEDURE — 82962 GLUCOSE BLOOD TEST: CPT

## 2017-03-15 PROCEDURE — C1781 MESH (IMPLANTABLE): HCPCS

## 2017-03-15 PROCEDURE — 84132 ASSAY OF SERUM POTASSIUM: CPT

## 2017-03-15 PROCEDURE — 82947 ASSAY GLUCOSE BLOOD QUANT: CPT

## 2017-03-15 PROCEDURE — 80048 BASIC METABOLIC PNL TOTAL CA: CPT

## 2017-03-15 PROCEDURE — 88302 TISSUE EXAM BY PATHOLOGIST: CPT

## 2017-03-15 PROCEDURE — C9290 INJ, BUPIVACAINE LIPOSOME: HCPCS

## 2017-03-15 PROCEDURE — 93010 ELECTROCARDIOGRAM REPORT: CPT

## 2017-03-15 PROCEDURE — 36415 COLL VENOUS BLD VENIPUNCTURE: CPT

## 2017-03-15 PROCEDURE — 94660 CPAP INITIATION&MGMT: CPT

## 2017-03-15 RX ADMIN — OXYCODONE AND ACETAMINOPHEN PRN TAB: 5; 325 TABLET ORAL at 21:40

## 2017-03-15 NOTE — OPERATIVE REPORT
Operative Report


DATE OF SURGERY: 03/15/17


PREOPERATIVE DIAGNOSIS: Large incarcerated right inguinal hernia


POSTOPERATIVE DIAGNOSIS: Same, indirect


OPERATION: 1.  Exploration of right inguinal region.  2.  Repair of right 

inguinal hernia, indirect, with large ultra Pro ethicon prosthetic.  3.  

Extremely difficult modifier


SURGEON: RENETTA NORWOOD


ANESTHESIA: GA


TISSUE REMOVED OR ALTERED: Remnants of accessory sac


COMPLICATIONS: 


None


ESTIMATED BLOOD LOSS: minimal


INTRAOPERATIVE FINDINGS: see below


PROCEDURE: 


The patient was seen in the preoperative holding area with a right inguinal 

area was marked by Dr. Ingram.  He was then taken to the main operating room and 

general anesthesia was induced.  He is placed in the extreme Trendelenburg, 

right inguinal area and testicles prepped and draped in sterile fashion.





Surgical plan and surgical timeout were conducted.





The skin was anesthetized with quarter percent Marcaine.  A standard right 

inguinal herniorrhaphy incision was made with a #10 blade.  Subcutaneous tissue 

incision Ryann's fascia and associated superficial veins were divided and 

ligated as encountered.





Deeper tissue including the external oblique aponeurosis was anesthetized with 1

% Marcaine.  The external oblique aponeurosis was opened, and the external ring 

freed up.  The ilioinguinal nerve was identified and preserved throughout the 

dissection although it did take somewhat of a terms of blunt trauma





We began mobilizing the contents of the inguinal floor.  The principal finding 

was a very large 5-8 cm wide cylindrical tube of tissue which was bluntly 

mobilized and surrounded with a Penrose drain.  A combination of blunt and 

electrocautery dissection we essentially dissected free all the surrounding 

tissue which to the right hemiscrotum.  Coming up with the Penrose drain and 

contents therein included a large incarcerated right inguinal hernia sac seated 

sac with cremasteric stranded tissues, and the right testicle.  We  

the hernia sacs from the right testicle and associated cord structures and 

returned them to the right hemiscrotum.





We then dissected the striated cremasteric muscles away from the incarcerated 

sac.  Portions of the striated muscle were debrided and sent to pathology.  The 

large sac containing incarcerated omentum, and was photographed for record 

keeping purposes.  This was an indirect sac containing no bowel at this time.  

In order to keep the contents contained, I reduced the entire sac into the 

retroperitoneal space bluntly.  I did not attempt to free the omentum from the 

sac because it was pliable but densely adhesed and I felt that the benefits did 

not outweigh the risks.  The sac was able to recess comfortably into the 

pelvis.  From here I was able to use this opening to bluntly dissected all 

retroperitoneal tissue off of the anterior pelvic wall.  We then deployed a 

large ultraPro Ethicon prosthetic mesh into the right inguinal area.  The inner 

component was splayed out in the retroperitoneal space as best we could 

manipulate it.  It was extremely difficult because of the deep location of the 

structures in relation to a very thick abdominal wall.  Once this was 

accomplished, we trimmed the external component with a U shaped notch in the 12 

o'clock position to become part of the chadd-internal ring.  The external 

component was then sewed to the conjoined tendon, and Umberto's ligament with 

approximately 8 interrupted 0 PDS sutures.  The new internal ring was of 

appropriate snugness.  We then closed the external oblique aponeurosis to Vicryl

, Ryann's fascia with 2-0 Vicryl, skin with 3-0 Vicryl, and finally Dermabond 

glue.





20 mL of full strength exparel is injected into the subcutaneous tissue.  The  

right hemiscrotum was inspected carefully and found to contain the right 

testicle.  I did not place any sutures further secure the right testicle.





Patient tolerated the procedure well, was extubated and taken to recovery room 

stable condition





Extremely difficult goal modifier is used because of the prolonged operation 

proximal one half hours, deep struggle result of the  thick anterior abdominal 

wall secondary to morbid obesity.

## 2017-03-15 NOTE — PDOC DISCHARGE SUMMARY
Discharge Summary (SDC)





- Discharge


Final Diagnosis: 


Incarcerated right inguinal hernia


Date of Surgery: 03/15/17


Discharge Date: 03/15/17


Condition: Good


Treatment or Instructions: 








 [facility]


 [address], [place] [address]


 Phone: [phone]    Fax:  [phone]


 











Discharge Instructions: Open Abdominal Procedures (Hernia)





1.General Information: 


a.   DO NOT DRIVE a car or operative machinery for 1-2 weeks or as long as 

taking Narcotic pain medication. 


b.   DO NOT consume alcohol, tranquilizers, sleeping medication, or any non-

prescribed medication for 24 hours unless approved by your doctor or as long as 

taking pain medication.


c.   DO NOT make important decisions or sign any important papers for the first 

24 hours after surgery.


d.   When discharged home the same day as surgery have a responsible person 

with you the first night. 


2.Activity Restriction: 2 weeks; 


a.   Avoid heavy lifting (> 10-15 lbs), straining abdominal muscles and sports, 

mowing lawn, vacuum  and bending over a lot.


b.   Walking is important to avoid blood clots in the legs and deep breathing 

can prevent pneumonia. 


c.   If it fine to go for walks, up and down steps, and ride in a car.


3.Treatment:


a.   You may remove dressing or Band-Aids the day after surgery and shower then 

daily is fine, but you should not bathe in a tub or go swimming for 2 weeks. 


b.   If you have paper strips (steri strips) on the skin, do not remove them as 

they will fall off in the coming weeks. Pat them dry after your shower. Sutures 

beneath the paper strips                               dissolve. If you have 

skin sutures or metal staples they will be removed on your follow up visit. 

They may also get wet with a shower. 


c.   Do not use oils, powders, or lotion on your incision.


4.Medications:


a.   You may take narcotic prescription tablets for pain if needed, one or two 

every 4 hours (Percocet ). 


b.   Stop the narcotic when able since you cannot take it and drive and they 

cause constipation. You may switch to plain Tylenol, Advil, or Aleve as you 

transition from the narcotic.                                 Many adults find 

good pain relief with Advil 600-800 mg three times a day with meal to work well 

and avoid narcotic use. High dose Advil should only be used for short courses 

                           since it can cause indigestion, ulcer bleeding in 

the stomach and kidney problems. 


c.   You may resume all normal medications unless a change is specified by your 

doctors. 


d.   


a.   If going home the same day as surgery start with clear liquids, and if you 

do well then advance to normal foods low inf fat and protein. Smaller portion 

size may be wise the first night. 


b.   When discharged after hospital stay you may resume a normal diet. 


6.Notify Physician If:


      a. Pain is not relieved by pain medication


      b. Persistent nausea and vomiting


      c. Chills, fever (above 101)


      d. Persistent bleeding or swelling at the operative site


      e. Unable to urinate for 6-8 hours


      f. Increased redness, drainage, or foul smelling discharge from incision


7. Follow Up Care:


       a. Please call our office to schedule an appointment with your doctor 

for 2 weeks. In the event of any postoperative 


           problems or questions you may call our office during business hours 

or the On-Call surgeon  through the 


            at [facility]. 


            [facility] [phone]


            [facility] [phone] (Ask for the surgeon on call)


      b. I understand the instructions for my postoperative care as described 

above and a copy has been given to me.





    ______________________________                _____________________________

_              ___________


    Witness                                                               

Patient/Significant Other                                  Date


Prescriptions: 


Oxycodone HCl/Acetaminophen [Percocet 5-325 mg Tablet] 2 tab PO ASDIR PRN #15 

tab


 PRN Reason: 


Discharge Diet: As Tolerated


Discharge Activity: Activity As Tolerated, No Lifting Over 10 Pounds


Home Care Assistance: None Needed


Report the Following to Your Physician Immediately: Shortness of Breath, 

Increase in Pain, Fever over 101 Degrees - Call me when ready to go home

## 2017-03-16 VITALS — SYSTOLIC BLOOD PRESSURE: 124 MMHG | DIASTOLIC BLOOD PRESSURE: 71 MMHG

## 2017-03-16 RX ADMIN — OXYCODONE AND ACETAMINOPHEN PRN TAB: 5; 325 TABLET ORAL at 06:11

## 2017-03-16 RX ADMIN — OXYCODONE AND ACETAMINOPHEN PRN TAB: 5; 325 TABLET ORAL at 01:33

## 2019-07-21 ENCOUNTER — HOSPITAL ENCOUNTER (EMERGENCY)
Dept: HOSPITAL 62 - ER | Age: 59
Discharge: HOME | End: 2019-07-21
Payer: OTHER GOVERNMENT

## 2019-07-21 VITALS — DIASTOLIC BLOOD PRESSURE: 75 MMHG | SYSTOLIC BLOOD PRESSURE: 140 MMHG

## 2019-07-21 DIAGNOSIS — Z95.1: ICD-10-CM

## 2019-07-21 DIAGNOSIS — E11.65: Primary | ICD-10-CM

## 2019-07-21 DIAGNOSIS — I25.10: ICD-10-CM

## 2019-07-21 DIAGNOSIS — Z87.891: ICD-10-CM

## 2019-07-21 DIAGNOSIS — Z91.14: ICD-10-CM

## 2019-07-21 DIAGNOSIS — T38.3X6A: ICD-10-CM

## 2019-07-21 DIAGNOSIS — Z79.4: ICD-10-CM

## 2019-07-21 DIAGNOSIS — H53.8: ICD-10-CM

## 2019-07-21 DIAGNOSIS — I10: ICD-10-CM

## 2019-07-21 LAB
ADD MANUAL DIFF: NO
ALBUMIN SERPL-MCNC: 4.1 G/DL (ref 3.5–5)
ALP SERPL-CCNC: 82 U/L (ref 38–126)
ALT SERPL-CCNC: 18 U/L (ref 21–72)
ANION GAP SERPL CALC-SCNC: 10 MMOL/L (ref 5–19)
AST SERPL-CCNC: 18 U/L (ref 17–59)
BASE EXCESS BLDV CALC-SCNC: -2.2 MMOL/L
BASOPHILS # BLD AUTO: 0.1 10^3/UL (ref 0–0.2)
BASOPHILS NFR BLD AUTO: 1 % (ref 0–2)
BILIRUB DIRECT SERPL-MCNC: 0.2 MG/DL (ref 0–0.4)
BILIRUB SERPL-MCNC: 0.5 MG/DL (ref 0.2–1.3)
BUN SERPL-MCNC: 28 MG/DL (ref 7–20)
CALCIUM: 9.5 MG/DL (ref 8.4–10.2)
CHLORIDE SERPL-SCNC: 101 MMOL/L (ref 98–107)
CO2 SERPL-SCNC: 28 MMOL/L (ref 22–30)
EOSINOPHIL # BLD AUTO: 0.4 10^3/UL (ref 0–0.6)
EOSINOPHIL NFR BLD AUTO: 3.8 % (ref 0–6)
ERYTHROCYTE [DISTWIDTH] IN BLOOD BY AUTOMATED COUNT: 13.6 % (ref 11.5–14)
GLUCOSE SERPL-MCNC: 146 MG/DL (ref 75–110)
HCO3 BLDV-SCNC: 24.4 MMOL/L (ref 20–32)
HCT VFR BLD CALC: 37 % (ref 37.9–51)
HGB BLD-MCNC: 12.3 G/DL (ref 13.5–17)
LYMPHOCYTES # BLD AUTO: 3.4 10^3/UL (ref 0.5–4.7)
LYMPHOCYTES NFR BLD AUTO: 33.3 % (ref 13–45)
MCH RBC QN AUTO: 26.8 PG (ref 27–33.4)
MCHC RBC AUTO-ENTMCNC: 33.1 G/DL (ref 32–36)
MCV RBC AUTO: 81 FL (ref 80–97)
MONOCYTES # BLD AUTO: 0.9 10^3/UL (ref 0.1–1.4)
MONOCYTES NFR BLD AUTO: 9 % (ref 3–13)
NEUTROPHILS # BLD AUTO: 5.3 10^3/UL (ref 1.7–8.2)
NEUTS SEG NFR BLD AUTO: 52.9 % (ref 42–78)
PCO2 BLDV: 48.8 MMHG (ref 35–63)
PH BLDV: 7.32 [PH] (ref 7.3–7.42)
PLATELET # BLD: 372 10^3/UL (ref 150–450)
POTASSIUM SERPL-SCNC: 4.1 MMOL/L (ref 3.6–5)
PROT SERPL-MCNC: 7.1 G/DL (ref 6.3–8.2)
RBC # BLD AUTO: 4.57 10^6/UL (ref 4.35–5.55)
TOTAL CELLS COUNTED % (AUTO): 100 %
WBC # BLD AUTO: 10.1 10^3/UL (ref 4–10.5)

## 2019-07-21 PROCEDURE — 80053 COMPREHEN METABOLIC PANEL: CPT

## 2019-07-21 PROCEDURE — 82803 BLOOD GASES ANY COMBINATION: CPT

## 2019-07-21 PROCEDURE — 36415 COLL VENOUS BLD VENIPUNCTURE: CPT

## 2019-07-21 PROCEDURE — 99284 EMERGENCY DEPT VISIT MOD MDM: CPT

## 2019-07-21 PROCEDURE — 85025 COMPLETE CBC W/AUTO DIFF WBC: CPT

## 2019-07-21 NOTE — ER DOCUMENT REPORT
ED General





- General


Chief Complaint: High Blood Sugar


Stated Complaint: HIGH BLOOD SUGAR


Time Seen by Provider: 07/21/19 01:32


Primary Care Provider: 


ANGLE RHODES MD [NO LOCAL MD] - Follow up as needed


TRAVEL OUTSIDE OF THE [place] IN LAST 30 DAYS: No





- HPI


Notes: 





Patient is a 58-year-old male with a history of type 2 diabetes presents 

emergency department for evaluation of hyperglycemia.  He admits he did not take

his 7030 until 4:00 this afternoon, while he normally takes in the morning.  He 

denies any diet out of the ordinary.  He states he had ham, macaroni and cheese,

peas for dinner.  He denies any fevers or chills.  No nausea or vomiting.  No 

urinary symptoms.  He states he has had some intermittent blurred vision.  

Otherwise he is taking his medications as prescribed, denies any pain.  No 

recent illnesses.





- Related Data


Allergies/Adverse Reactions: 


                                        





No Known Allergies Allergy (Verified 05/27/14 08:19)


   











Past Medical History





- General


Information source: Patient





- Social History


Smoking Status: Former Smoker


Frequency of alcohol use: None


Family History: CAD, CVA, Hypertension





- Past Medical History


Cardiac Medical History: Reports: Hx Coronary Artery Disease, Hx Heart Attack - 

2015, Hx Hypercholesterolemia, Hx Hypertension


Pulmonary Medical History: Reports: Hx Sleep Apnea


   Denies: Hx Asthma, Hx Bronchitis, Hx COPD, Hx Pneumonia


Neurological Medical History: Denies: Hx Cerebrovascular Accident, Hx Seizures


Endocrine Medical History: Reports: Hx Diabetes Mellitus Type 2


Renal/ Medical History: Reports: Hx Benign Prostatic Hyperplasia.  Denies: Hx 

Peritoneal Dialysis


Musculoskeletal Medical History: Reports Hx Arthritis - knees


Traumatic Medical History: Reports: Hx Fractures - L ankle


Past Surgical History: Reports: Hx Cardiac Surgery - CABGx3 10/2014, Hx Coronary

Artery Bypass Graft, Other - Removal of blister left foot, likely diabetic in 

nature





- Immunizations


Hx Diphtheria, Pertussis, Tetanus Vaccination: Yes


Hx Pneumococcal Vaccination: 01/01/17





Review of Systems





- Review of Systems


EENT: See HPI


Cardiovascular: No symptoms reported


Respiratory: No symptoms reported


Gastrointestinal: No symptoms reported


Genitourinary: No symptoms reported


Musculoskeletal: No symptoms reported


Neurological/Psychological: No symptoms reported





Physical Exam





- Vital signs


Vitals: 


                                        











Temp Pulse Resp BP Pulse Ox


 


 97.8 F   88   18   140/75 H  97 


 


 07/21/19 00:37  07/21/19 00:37  07/21/19 00:37  07/21/19 00:37  07/21/19 00:37














- Notes


Notes: 





Vital signs reviewed, please refer to chart. Head is normocephalic, atraumatic. 

Pupils equal round, reactive to light.  Neck is supple without meningismus.  

Heart is regular rate and rhythm.  Lungs are clear to auscultation bilaterally. 

Abdomen is obese, soft, nontender, normoactive bowel sounds throughout.  

Extremities without cyanosis, clubbing. Posterior calves are nontender.  Periphe

ral pulses are equal.  Skin is warm and dry.  Patient is awake, alert, 

neurological exam is nonfocal.





Course





- Re-evaluation


Re-evalutation: 





07/21/19 03:09


Patient presents to the emergency department for evaluation of elevated blood 

glucose.  He states his blood sugar was too high to measure at home.  He states 

he took his insulin late, but denies any significant changes in diet or 

medication otherwise.  On arrival here he had blood work obtained, was given IV 

fluids.  Blood work revealed a blood sugar in the 150s.  He received 

approximately 550 cc of normal saline, this was discontinued by myself.  Patient

is stable.  He is to continue his home medications as prescribed.  Follow-up 

with primary care, return to the ED with worsening or new concerning symptoms of

any sort.





- Vital Signs


Vital signs: 


                                        











Temp Pulse Resp BP Pulse Ox


 


 97.8 F   88   18   140/75 H  97 


 


 07/21/19 00:37  07/21/19 00:37  07/21/19 00:37  07/21/19 00:37  07/21/19 00:37














- Laboratory


Result Diagrams: 


                                 07/21/19 02:39





                                 07/21/19 02:39


Laboratory results interpreted by me: 


                                        











  07/21/19 07/21/19





  02:39 02:39


 


Hgb  12.3 L 


 


Hct  37.0 L 


 


MCH  26.8 L 


 


BUN   28 H


 


Creatinine   1.53 H


 


Est GFR ( Amer)   57 L


 


Est GFR (Non-Af Amer)   47 L


 


Glucose   146 H


 


ALT   18 L














Discharge





- Discharge


Clinical Impression: 


Hyperglycemia due to type 2 diabetes mellitus


Qualifiers:


 Diabetes mellitus long term insulin use: with long term use Qualified Code(s): 

E11.65 - Type 2 diabetes mellitus with hyperglycemia; Z79.4 - Long term 

(current) use of insulin





Condition: Stable


Disposition: HOME, SELF-CARE


Instructions:  Hyperglycemia ([facility])


Additional Instructions: 


Your blood glucose here was only mildly elevated.  Take your medications at home

as directed.  Follow up with your primary care physician this week.  If you 

develop worsening or new concerning symptoms of any sort, return to the ER for 

further evaluation.  


Referrals: 


ANGLE RHODES MD [NO LOCAL MD] - Follow up as needed

## 2019-10-02 ENCOUNTER — HOSPITAL ENCOUNTER (OUTPATIENT)
Dept: HOSPITAL 62 - SC | Age: 59
Discharge: HOME | End: 2019-10-02
Attending: OPHTHALMOLOGY
Payer: OTHER GOVERNMENT

## 2019-10-02 DIAGNOSIS — K21.9: ICD-10-CM

## 2019-10-02 DIAGNOSIS — H25.12: Primary | ICD-10-CM

## 2019-10-02 DIAGNOSIS — E11.9: ICD-10-CM

## 2019-10-02 DIAGNOSIS — Z87.891: ICD-10-CM

## 2019-10-02 DIAGNOSIS — Z79.4: ICD-10-CM

## 2019-10-02 DIAGNOSIS — Z79.84: ICD-10-CM

## 2019-10-02 DIAGNOSIS — Z79.899: ICD-10-CM

## 2019-10-02 DIAGNOSIS — I10: ICD-10-CM

## 2019-10-02 DIAGNOSIS — E78.00: ICD-10-CM

## 2019-10-02 PROCEDURE — 82962 GLUCOSE BLOOD TEST: CPT

## 2019-10-02 PROCEDURE — 00142 ANES PX ON EYE LENS SURGERY: CPT

## 2019-10-02 PROCEDURE — 66984 XCAPSL CTRC RMVL W/O ECP: CPT

## 2019-10-02 PROCEDURE — V2632 POST CHMBR INTRAOCULAR LENS: HCPCS

## 2019-10-02 RX ADMIN — TROPICAMIDE PRN DROP: 10 SOLUTION/ DROPS OPHTHALMIC at 08:13

## 2019-10-02 RX ADMIN — CYCLOPENTOLATE HYDROCHLORIDE AND PHENYLEPHRINE HYDROCHLORIDE PRN DROP: 2; 10 SOLUTION/ DROPS OPHTHALMIC at 08:25

## 2019-10-02 RX ADMIN — BESIFLOXACIN PRN DROP: 6 SUSPENSION OPHTHALMIC at 09:02

## 2019-10-02 RX ADMIN — TROPICAMIDE PRN DROP: 10 SOLUTION/ DROPS OPHTHALMIC at 08:03

## 2019-10-02 RX ADMIN — TOBRAMYCIN AND DEXAMETHASONE ONE APPLIC: 3; 1 OINTMENT OPHTHALMIC at 09:02

## 2019-10-02 RX ADMIN — DORZOLAMIDE HYDROCHLORIDE AND TIMOLOL MALEATE PRN DROP: 20; 5 SOLUTION OPHTHALMIC at 00:00

## 2019-10-02 RX ADMIN — TOBRAMYCIN AND DEXAMETHASONE ONE APPLIC: 3; 1 OINTMENT OPHTHALMIC at 00:00

## 2019-10-02 RX ADMIN — BESIFLOXACIN PRN DROP: 6 SUSPENSION OPHTHALMIC at 00:00

## 2019-10-02 RX ADMIN — TETRACAINE HYDROCHLORIDE PRN DROP: 5 SOLUTION OPHTHALMIC at 08:02

## 2019-10-02 RX ADMIN — TROPICAMIDE PRN DROP: 10 SOLUTION/ DROPS OPHTHALMIC at 08:25

## 2019-10-02 RX ADMIN — BESIFLOXACIN PRN DROP: 6 SUSPENSION OPHTHALMIC at 08:03

## 2019-10-02 RX ADMIN — DORZOLAMIDE HYDROCHLORIDE AND TIMOLOL MALEATE PRN DROP: 20; 5 SOLUTION OPHTHALMIC at 09:02

## 2019-10-02 RX ADMIN — CYCLOPENTOLATE HYDROCHLORIDE AND PHENYLEPHRINE HYDROCHLORIDE PRN DROP: 2; 10 SOLUTION/ DROPS OPHTHALMIC at 08:03

## 2019-10-02 RX ADMIN — TETRACAINE HYDROCHLORIDE PRN DROP: 5 SOLUTION OPHTHALMIC at 08:41

## 2019-10-02 RX ADMIN — TETRACAINE HYDROCHLORIDE PRN DROP: 5 SOLUTION OPHTHALMIC at 08:25

## 2019-10-02 RX ADMIN — CYCLOPENTOLATE HYDROCHLORIDE AND PHENYLEPHRINE HYDROCHLORIDE PRN DROP: 2; 10 SOLUTION/ DROPS OPHTHALMIC at 08:13

## 2019-10-02 RX ADMIN — BESIFLOXACIN PRN DROP: 6 SUSPENSION OPHTHALMIC at 08:14

## 2019-10-16 ENCOUNTER — HOSPITAL ENCOUNTER (OUTPATIENT)
Dept: HOSPITAL 62 - SC | Age: 59
Discharge: HOME | End: 2019-10-16
Attending: OPHTHALMOLOGY
Payer: OTHER GOVERNMENT

## 2019-10-16 DIAGNOSIS — E11.9: ICD-10-CM

## 2019-10-16 DIAGNOSIS — Z79.899: ICD-10-CM

## 2019-10-16 DIAGNOSIS — Z79.84: ICD-10-CM

## 2019-10-16 DIAGNOSIS — Z79.1: ICD-10-CM

## 2019-10-16 DIAGNOSIS — Z87.891: ICD-10-CM

## 2019-10-16 DIAGNOSIS — I10: ICD-10-CM

## 2019-10-16 DIAGNOSIS — Z98.42: ICD-10-CM

## 2019-10-16 DIAGNOSIS — E78.00: ICD-10-CM

## 2019-10-16 DIAGNOSIS — H25.11: Primary | ICD-10-CM

## 2019-10-16 PROCEDURE — V2632 POST CHMBR INTRAOCULAR LENS: HCPCS

## 2019-10-16 PROCEDURE — 66984 XCAPSL CTRC RMVL W/O ECP: CPT

## 2019-10-16 PROCEDURE — 82962 GLUCOSE BLOOD TEST: CPT

## 2019-10-16 PROCEDURE — 00142 ANES PX ON EYE LENS SURGERY: CPT

## 2019-10-16 RX ADMIN — HEPARIN SODIUM ONE ML: 1000 INJECTION, SOLUTION INTRAVENOUS; SUBCUTANEOUS at 10:07

## 2019-10-16 RX ADMIN — TETRACAINE HYDROCHLORIDE PRN DROP: 5 SOLUTION OPHTHALMIC at 09:52

## 2019-10-16 RX ADMIN — TROPICAMIDE PRN DROP: 10 SOLUTION/ DROPS OPHTHALMIC at 09:33

## 2019-10-16 RX ADMIN — DORZOLAMIDE HYDROCHLORIDE AND TIMOLOL MALEATE PRN DROP: 20; 5 SOLUTION OPHTHALMIC at 10:17

## 2019-10-16 RX ADMIN — CYCLOPENTOLATE HYDROCHLORIDE AND PHENYLEPHRINE HYDROCHLORIDE PRN DROP: 2; 10 SOLUTION/ DROPS OPHTHALMIC at 09:23

## 2019-10-16 RX ADMIN — BESIFLOXACIN PRN DROP: 6 SUSPENSION OPHTHALMIC at 00:00

## 2019-10-16 RX ADMIN — BESIFLOXACIN PRN DROP: 6 SUSPENSION OPHTHALMIC at 10:17

## 2019-10-16 RX ADMIN — TOBRAMYCIN AND DEXAMETHASONE ONE APPLIC: 3; 1 OINTMENT OPHTHALMIC at 10:17

## 2019-10-16 RX ADMIN — TOBRAMYCIN AND DEXAMETHASONE ONE APPLIC: 3; 1 OINTMENT OPHTHALMIC at 00:00

## 2019-10-16 RX ADMIN — EPINEPHRINE ONE MG: 1 INJECTION, SOLUTION, CONCENTRATE INTRAVENOUS at 10:07

## 2019-10-16 RX ADMIN — TROPICAMIDE PRN DROP: 10 SOLUTION/ DROPS OPHTHALMIC at 09:23

## 2019-10-16 RX ADMIN — EPINEPHRINE ONE MG: 1 INJECTION, SOLUTION, CONCENTRATE INTRAVENOUS at 00:00

## 2019-10-16 RX ADMIN — TETRACAINE HYDROCHLORIDE PRN DROP: 5 SOLUTION OPHTHALMIC at 09:44

## 2019-10-16 RX ADMIN — CYCLOPENTOLATE HYDROCHLORIDE AND PHENYLEPHRINE HYDROCHLORIDE PRN DROP: 2; 10 SOLUTION/ DROPS OPHTHALMIC at 09:33

## 2019-10-16 RX ADMIN — SODIUM CHONDROITIN SULFATE / SODIUM HYALURONATE ONE EACH: 0.55-0.5 INJECTION INTRAOCULAR at 00:00

## 2019-10-16 RX ADMIN — SODIUM CHONDROITIN SULFATE / SODIUM HYALURONATE ONE EACH: 0.55-0.5 INJECTION INTRAOCULAR at 10:07

## 2019-10-16 RX ADMIN — HEPARIN SODIUM ONE ML: 1000 INJECTION, SOLUTION INTRAVENOUS; SUBCUTANEOUS at 00:00

## 2019-10-16 RX ADMIN — BESIFLOXACIN PRN DROP: 6 SUSPENSION OPHTHALMIC at 09:34

## 2019-10-16 RX ADMIN — TROPICAMIDE PRN DROP: 10 SOLUTION/ DROPS OPHTHALMIC at 09:43

## 2019-10-16 RX ADMIN — DORZOLAMIDE HYDROCHLORIDE AND TIMOLOL MALEATE PRN DROP: 20; 5 SOLUTION OPHTHALMIC at 00:00

## 2019-10-16 RX ADMIN — TETRACAINE HYDROCHLORIDE PRN DROP: 5 SOLUTION OPHTHALMIC at 09:23

## 2019-10-16 RX ADMIN — CYCLOPENTOLATE HYDROCHLORIDE AND PHENYLEPHRINE HYDROCHLORIDE PRN DROP: 2; 10 SOLUTION/ DROPS OPHTHALMIC at 09:44

## 2019-10-16 RX ADMIN — BESIFLOXACIN PRN DROP: 6 SUSPENSION OPHTHALMIC at 09:23
